# Patient Record
Sex: MALE | Race: WHITE | NOT HISPANIC OR LATINO
[De-identification: names, ages, dates, MRNs, and addresses within clinical notes are randomized per-mention and may not be internally consistent; named-entity substitution may affect disease eponyms.]

---

## 2021-11-18 PROBLEM — Z00.00 ENCOUNTER FOR PREVENTIVE HEALTH EXAMINATION: Status: ACTIVE | Noted: 2021-11-18

## 2021-11-19 ENCOUNTER — NON-APPOINTMENT (OUTPATIENT)
Age: 76
End: 2021-11-19

## 2021-11-19 ENCOUNTER — APPOINTMENT (OUTPATIENT)
Dept: UROLOGY | Facility: CLINIC | Age: 76
End: 2021-11-19
Payer: MEDICARE

## 2021-11-19 VITALS
TEMPERATURE: 97.2 F | DIASTOLIC BLOOD PRESSURE: 84 MMHG | OXYGEN SATURATION: 99 % | HEART RATE: 75 BPM | SYSTOLIC BLOOD PRESSURE: 172 MMHG

## 2021-11-19 PROCEDURE — 99204 OFFICE O/P NEW MOD 45 MIN: CPT

## 2021-11-19 NOTE — LETTER BODY
[Dear  ___] : Dear  [unfilled], [Consult Letter:] : I had the pleasure of evaluating your patient, [unfilled]. [Please see my note below.] : Please see my note below. [Consult Closing:] : Thank you very much for allowing me to participate in the care of this patient.  If you have any questions, please do not hesitate to contact me. [FreeTextEntry3] : Best Regards, \par \par Khadra Roper MD\par

## 2021-11-19 NOTE — ASSESSMENT
[FreeTextEntry1] : We discussed the replacement of the present Coloplast Laila prosthesis with an Ambicor inflatable penile prosthesis in detail with the indications risks alternatives and chances for success I reviewed with him the small chance of postoperative infection that would require removal of the Ambicor prosthesis and replacement with another semirigid type prosthesis.  We also discussed preoperative evaluation and clearance with Dr. Harding and his cardiologist and our preoperative protocol to minimize the chance of postoperative infection.  Patient is amenable with this approach consents to surgery date was made.\par \par Urine sent for culture today. Khadra Roper MD\par

## 2021-11-19 NOTE — PHYSICAL EXAM
[General Appearance - Well Developed] : well developed [General Appearance - Well Nourished] : well nourished [Normal Appearance] : normal appearance [Well Groomed] : well groomed [General Appearance - In No Acute Distress] : no acute distress [Edema] : no peripheral edema [Respiration, Rhythm And Depth] : normal respiratory rhythm and effort [] : no respiratory distress [Exaggerated Use Of Accessory Muscles For Inspiration] : no accessory muscle use [Abdomen Soft] : soft [Abdomen Tenderness] : non-tender [Costovertebral Angle Tenderness] : no ~M costovertebral angle tenderness [Urethral Meatus] : meatus normal [Testes Tenderness] : no tenderness of the testes [Testes Mass (___cm)] : there were no testicular masses [Normal Station and Gait] : the gait and station were normal for the patient's age [No Focal Deficits] : no focal deficits [Oriented To Time, Place, And Person] : oriented to person, place, and time [Affect] : the affect was normal [Mood] : the mood was normal [Not Anxious] : not anxious [FreeTextEntry1] : Malleable penile prosthesis with rotational instability, no signs of infection,

## 2021-11-19 NOTE — HISTORY OF PRESENT ILLNESS
[FreeTextEntry1] : 73 YO M seen TODAY 11/19/2021 as NPT for consultation after a failed implant. PAtient had [facility] ago. Due to post op ED, he underwent implantation of 3 piece inflatable IPP 11 yr ago. This functioned until 1 yr ago. Patient underwent removal and replacement surgery by Jeovanny Stark on 8/13/2021. At that time, Dr. 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 He was unable to remove the right reservoir. PAtient has healed well, but is very dissatisfied with the malleable prosthesis that was implanted. It is unstable and anita. He is here to discuss options.

## 2021-11-23 LAB
BACTERIA UR CULT: NORMAL
BILIRUB UR QL STRIP: NORMAL
CLARITY UR: CLEAR
COLLECTION METHOD: NORMAL
GLUCOSE UR-MCNC: NORMAL
HCG UR QL: 0.2 EU/DL
HGB UR QL STRIP.AUTO: NORMAL
KETONES UR-MCNC: NORMAL
LEUKOCYTE ESTERASE UR QL STRIP: NORMAL
NITRITE UR QL STRIP: NORMAL
PH UR STRIP: 6.5
PROT UR STRIP-MCNC: NORMAL
SP GR UR STRIP: 1.02

## 2021-12-15 ENCOUNTER — APPOINTMENT (OUTPATIENT)
Dept: UROLOGY | Facility: AMBULATORY SURGERY CENTER | Age: 76
End: 2021-12-15

## 2022-01-04 ENCOUNTER — TRANSCRIPTION ENCOUNTER (OUTPATIENT)
Age: 77
End: 2022-01-04

## 2022-01-04 ENCOUNTER — NON-APPOINTMENT (OUTPATIENT)
Age: 77
End: 2022-01-04

## 2022-01-04 VITALS
OXYGEN SATURATION: 99 % | SYSTOLIC BLOOD PRESSURE: 136 MMHG | HEIGHT: 69 IN | HEART RATE: 71 BPM | WEIGHT: 165.79 LBS | RESPIRATION RATE: 16 BRPM | TEMPERATURE: 98 F | DIASTOLIC BLOOD PRESSURE: 81 MMHG

## 2022-01-04 NOTE — ASU PATIENT PROFILE, ADULT - NSICDXPASTSURGICALHX_GEN_ALL_CORE_FT
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

## 2022-01-04 NOTE — ASU PATIENT PROFILE, ADULT - VISION (WITH CORRECTIVE LENSES IF THE PATIENT USUALLY WEARS THEM):
wear reading glasses/Normal vision: sees adequately in most situations; can see medication labels, newsprint

## 2022-01-04 NOTE — PRE-OP CHECKLIST - 1.
Spoke with Anesthesia MD Cole who directed to call surgeon. Spoke with MD Roper regarding ASA stopped on 12/31/21, MD Roper states he will call cardiologist and will call back about patient's instructions for ASA prior to surgery tomorrow.

## 2022-01-04 NOTE — ASU PATIENT PROFILE, ADULT - NSALCOHOLTYPE_GEN__A_CORE_SD
Hearing screen attempted multiple times, unsuccessful.  Screening team will return tomorrow per  Ti at [phone] .   wine

## 2022-01-04 NOTE — ASU PATIENT PROFILE, ADULT - FALL HARM RISK - UNIVERSAL INTERVENTIONS
Bed in lowest position, wheels locked, appropriate side rails in place/Call bell, personal items and telephone in reach/Instruct patient to call for assistance before getting out of bed or chair/Non-slip footwear when patient is out of bed/[place] to call system/Physically safe environment - no spills, clutter or unnecessary equipment/Purposeful Proactive Rounding/Room/bathroom lighting operational, light cord in reach

## 2022-01-04 NOTE — PRE-OP CHECKLIST - SELECT TESTS ORDERED
Urine Cx; Cardiac clearance/CBC/CMP/PT/PTT/INR/Urinalysis/EKG Urine Cx; Cardiac clearance/CBC/CMP/PT/PTT/INR/Urinalysis/EKG/COVID-19

## 2022-01-04 NOTE — ASU PATIENT PROFILE, ADULT - NSICDXPASTMEDICALHX_GEN_ALL_CORE_FT
PAST MEDICAL HISTORY:  CAD (coronary artery disease)     Glaucoma     HLD (hyperlipidemia)     HTN (hypertension)     Severe aortic stenosis     Syncope 11/28/2021     PAST MEDICAL HISTORY:  CAD (coronary artery disease)     Chronic ITP (idiopathic thrombocytopenia)     Glaucoma     HLD (hyperlipidemia)     HTN (hypertension)     Prostate cancer     Severe aortic stenosis     Syncope 11/28/2021

## 2022-01-05 ENCOUNTER — TRANSCRIPTION ENCOUNTER (OUTPATIENT)
Age: 77
End: 2022-01-05

## 2022-01-05 ENCOUNTER — APPOINTMENT (OUTPATIENT)
Dept: UROLOGY | Facility: HOSPITAL | Age: 77
End: 2022-01-05

## 2022-01-05 ENCOUNTER — OUTPATIENT (OUTPATIENT)
Dept: OUTPATIENT SERVICES | Facility: HOSPITAL | Age: 77
LOS: 1 days | Discharge: HOME | End: 2022-01-05
Payer: MEDICARE

## 2022-01-05 VITALS
OXYGEN SATURATION: 98 % | DIASTOLIC BLOOD PRESSURE: 78 MMHG | HEART RATE: 82 BPM | RESPIRATION RATE: 27 BRPM | SYSTOLIC BLOOD PRESSURE: 155 MMHG

## 2022-01-05 DIAGNOSIS — Z96.0 PRESENCE OF UROGENITAL IMPLANTS: Chronic | ICD-10-CM

## 2022-01-05 DIAGNOSIS — Z98.890 OTHER SPECIFIED POSTPROCEDURAL STATES: Chronic | ICD-10-CM

## 2022-01-05 DIAGNOSIS — Z90.79 ACQUIRED ABSENCE OF OTHER GENITAL ORGAN(S): Chronic | ICD-10-CM

## 2022-01-05 DIAGNOSIS — Z95.0 PRESENCE OF CARDIAC PACEMAKER: Chronic | ICD-10-CM

## 2022-01-05 DIAGNOSIS — Z95.2 PRESENCE OF PROSTHETIC HEART VALVE: Chronic | ICD-10-CM

## 2022-01-05 PROCEDURE — C1813: CPT

## 2022-01-05 PROCEDURE — 54410 REMOVE/REPLACE PENIS PROSTH: CPT

## 2022-01-05 PROCEDURE — 54401 INSERT SELF-CONTD PROSTHESIS: CPT

## 2022-01-05 DEVICE — SURGIFLO HEMOSTATIC MATRIX KIT: Type: IMPLANTABLE DEVICE | Status: FUNCTIONAL

## 2022-01-05 DEVICE — IMPLANTABLE DEVICE: Type: IMPLANTABLE DEVICE | Status: FUNCTIONAL

## 2022-01-05 RX ORDER — ROSUVASTATIN CALCIUM 5 MG/1
1 TABLET ORAL
Qty: 0 | Refills: 0 | DISCHARGE

## 2022-01-05 RX ORDER — ASPIRIN/CALCIUM CARB/MAGNESIUM 324 MG
1 TABLET ORAL
Qty: 0 | Refills: 0 | DISCHARGE

## 2022-01-05 RX ORDER — ACETAMINOPHEN 500 MG
650 TABLET ORAL EVERY 6 HOURS
Refills: 0 | Status: DISCONTINUED | OUTPATIENT
Start: 2022-01-05 | End: 2022-01-05

## 2022-01-05 RX ORDER — MORPHINE SULFATE 50 MG/1
2 CAPSULE, EXTENDED RELEASE ORAL ONCE
Refills: 0 | Status: DISCONTINUED | OUTPATIENT
Start: 2022-01-05 | End: 2022-01-05

## 2022-01-05 RX ORDER — VANCOMYCIN HCL 1 G
1000 VIAL (EA) INTRAVENOUS ONCE
Refills: 0 | Status: COMPLETED | OUTPATIENT
Start: 2022-01-05 | End: 2022-01-05

## 2022-01-05 RX ORDER — GENTAMICIN SULFATE 40 MG/ML
240 VIAL (ML) INJECTION ONCE
Refills: 0 | Status: DISCONTINUED | OUTPATIENT
Start: 2022-01-05 | End: 2022-01-05

## 2022-01-05 RX ORDER — FLUOROMETHOLONE 1 MG/ML
1 SOLUTION/ DROPS OPHTHALMIC
Qty: 0 | Refills: 0 | DISCHARGE

## 2022-01-05 RX ORDER — BRIMONIDINE TARTRATE, TIMOLOL MALEATE 2; 5 MG/ML; MG/ML
1 SOLUTION/ DROPS OPHTHALMIC
Qty: 0 | Refills: 0 | DISCHARGE

## 2022-01-05 RX ORDER — HYDROMORPHONE HYDROCHLORIDE 2 MG/ML
0.5 INJECTION INTRAMUSCULAR; INTRAVENOUS; SUBCUTANEOUS EVERY 4 HOURS
Refills: 0 | Status: DISCONTINUED | OUTPATIENT
Start: 2022-01-05 | End: 2022-01-05

## 2022-01-05 RX ORDER — EZETIMIBE 10 MG/1
1 TABLET ORAL
Qty: 0 | Refills: 0 | DISCHARGE

## 2022-01-05 RX ORDER — CARVEDILOL PHOSPHATE 80 MG/1
1 CAPSULE, EXTENDED RELEASE ORAL
Qty: 0 | Refills: 0 | DISCHARGE

## 2022-01-05 RX ORDER — DOCUSATE SODIUM 100 MG
2 CAPSULE ORAL
Qty: 40 | Refills: 0
Start: 2022-01-05 | End: 2022-01-14

## 2022-01-05 RX ADMIN — MORPHINE SULFATE 2 MILLIGRAM(S): 50 CAPSULE, EXTENDED RELEASE ORAL at 17:37

## 2022-01-05 RX ADMIN — Medication 250 MILLIGRAM(S): at 12:04

## 2022-01-05 RX ADMIN — MORPHINE SULFATE 2 MILLIGRAM(S): 50 CAPSULE, EXTENDED RELEASE ORAL at 18:00

## 2022-01-05 NOTE — DISCHARGE NOTE NURSING/CASE MANAGEMENT/SOCIAL WORK - PATIENT PORTAL LINK FT
You can access the FollowMyHealth Patient Portal offered by [facility] by registering at the following website: [email or web address]. By joining [employer]’s FollowMyHealth portal, you will also be able to view your health information using other applications (apps) compatible with our system.

## 2022-01-05 NOTE — ASU DISCHARGE PLAN (ADULT/PEDIATRIC) - NS MD DC FALL RISK RISK
For information on Fall & Injury Prevention, visit: [email or web address] OR  [email or web address] OR  https://www.cdc.gov/steadi/patient.html

## 2022-01-05 NOTE — PACU DISCHARGE NOTE - COMMENTS
Patient met PACU criteria, surgical dressing intact, bills catheter to leg bag drainage with adequate clear yellow urine, discharge instructions provided to patient, patient escorted to lobby via wheelchair accompanied by PCA and discharged home accompanied by friend

## 2022-01-05 NOTE — ASU DISCHARGE PLAN (ADULT/PEDIATRIC) - CARE PROVIDER_API CALL
Khadra Roper)  Urology  [address], [address] [place] [address]  Phone: [phone]  Fax: [phone]  Scheduled Appointment: 01/07/2022

## 2022-01-05 NOTE — DISCHARGE NOTE NURSING/CASE MANAGEMENT/SOCIAL WORK - NSDCPEFALRISK_GEN_ALL_CORE
For information on Fall & Injury Prevention, visit: [email or web address] OR  [email or web address] OR  https://www.cdc.gov/steadi/patient.html

## 2022-01-06 ENCOUNTER — NON-APPOINTMENT (OUTPATIENT)
Age: 77
End: 2022-01-06

## 2022-01-06 PROBLEM — R55 SYNCOPE AND COLLAPSE: Chronic | Status: ACTIVE | Noted: 2022-01-04

## 2022-01-06 PROBLEM — E78.5 HYPERLIPIDEMIA, UNSPECIFIED: Chronic | Status: ACTIVE | Noted: 2022-01-04

## 2022-01-06 PROBLEM — D69.3 IMMUNE THROMBOCYTOPENIC PURPURA: Chronic | Status: ACTIVE | Noted: 2022-01-04

## 2022-01-06 PROBLEM — H40.9 UNSPECIFIED GLAUCOMA: Chronic | Status: ACTIVE | Noted: 2022-01-04

## 2022-01-06 PROBLEM — I25.10 ATHEROSCLEROTIC HEART DISEASE OF NATIVE CORONARY ARTERY WITHOUT ANGINA PECTORIS: Chronic | Status: ACTIVE | Noted: 2022-01-04

## 2022-01-06 PROBLEM — I35.0 NONRHEUMATIC AORTIC (VALVE) STENOSIS: Chronic | Status: ACTIVE | Noted: 2022-01-04

## 2022-01-06 PROBLEM — C61 MALIGNANT NEOPLASM OF PROSTATE: Chronic | Status: ACTIVE | Noted: 2022-01-04

## 2022-01-06 PROBLEM — I10 ESSENTIAL (PRIMARY) HYPERTENSION: Chronic | Status: ACTIVE | Noted: 2022-01-04

## 2022-01-06 RX ORDER — CEPHALEXIN 500 MG
3 CAPSULE ORAL
Qty: 60 | Refills: 0
Start: 2022-01-06 | End: 2022-01-15

## 2022-01-07 ENCOUNTER — APPOINTMENT (OUTPATIENT)
Dept: UROLOGY | Facility: CLINIC | Age: 77
End: 2022-01-07
Payer: MEDICARE

## 2022-01-07 VITALS
HEIGHT: 69 IN | DIASTOLIC BLOOD PRESSURE: 98 MMHG | SYSTOLIC BLOOD PRESSURE: 165 MMHG | HEART RATE: 50 BPM | BODY MASS INDEX: 23.7 KG/M2 | TEMPERATURE: 97.2 F | OXYGEN SATURATION: 99 % | WEIGHT: 160 LBS

## 2022-01-07 PROCEDURE — 99024 POSTOP FOLLOW-UP VISIT: CPT

## 2022-01-07 NOTE — ASSESSMENT
[FreeTextEntry1] : Jackson catheter was removed today and patient underwent successful voiding trial.  We discussed steps to help absorb his scrotal hematoma and make plans for him to follow-up in 1 week. Khadra Roper MD\par

## 2022-01-07 NOTE — LETTER BODY
[Dear  ___] : Dear  [unfilled], [Courtesy Letter:] : I had the pleasure of seeing your patient, [unfilled], in my office today. [Please see my note below.] : Please see my note below. [Consult Closing:] : Thank you very much for allowing me to participate in the care of this patient.  If you have any questions, please do not hesitate to contact me. [FreeTextEntry3] : Best Regards, \par \par Khadra Roper MD\par

## 2022-01-07 NOTE — PHYSICAL EXAM
[FreeTextEntry1] : Jackson in good position. Significant swelling and subcutaneous hematoma on the scrotum and right groin, incision clean and dry. no sign of infection.

## 2022-01-07 NOTE — HISTORY OF PRESENT ILLNESS
[FreeTextEntry1] : 75 YO M seen 11/19/2021 as NPT for consultation after a failed implant. PAtient [place] [facility] [place] ago. Due to post op ED, he underwent implantation of 3 piece inflatable IPP 11 yr ago. This functioned until 1 yr ago. Patient underwent removal and replacement surgery by Jeovanny Stark on 8/13/2021. At that time, Dr. 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 He was unable to remove the right reservoir. PAtient has healed well, but is very dissatisfied with the malleable prosthesis that was implanted. It is unstable and anita. He is here to discuss options.\par \par Patient underwent implantation of AMS Ambicor IPP at [facility] on 1/5/2022 with no complications.\par \par Patient seen TODAY 1/7/2022 for VT. He is comfortable since surgery, He started the Levaquin late but is now on the Keflex as prescribed.

## 2022-01-14 ENCOUNTER — APPOINTMENT (OUTPATIENT)
Dept: UROLOGY | Facility: CLINIC | Age: 77
End: 2022-01-14
Payer: MEDICARE

## 2022-01-14 LAB
BILIRUB UR QL STRIP: NORMAL
CLARITY UR: CLEAR
COLLECTION METHOD: NORMAL
GLUCOSE UR-MCNC: NORMAL
HCG UR QL: 0.2 EU/DL
HGB UR QL STRIP.AUTO: NORMAL
KETONES UR-MCNC: NORMAL
LEUKOCYTE ESTERASE UR QL STRIP: NORMAL
NITRITE UR QL STRIP: NORMAL
PH UR STRIP: 5.5
PROT UR STRIP-MCNC: NORMAL
SP GR UR STRIP: 1

## 2022-01-14 PROCEDURE — 99024 POSTOP FOLLOW-UP VISIT: CPT

## 2022-01-14 PROCEDURE — 81003 URINALYSIS AUTO W/O SCOPE: CPT | Mod: QW

## 2022-01-14 NOTE — ASSESSMENT
[FreeTextEntry1] : Patient continues to heal postoperatively with slow resolution of his scrotal and suprapubic hematoma.  No sign or indication of infection at this time however we will continue to follow this closely and I will continue him on antibiotics for at least 1 more week.  These were ordered.  I deflated the prosthesis today by bending the shaft and he tolerated this well with no apparent pain.  He does have some pain on palpation of the scrotum.  Follow-up in 1 week. Khadra Roper MD\par \par Pharmacy info NA, message left for patient to update this info. Khadra Roper MD\par

## 2022-01-14 NOTE — PHYSICAL EXAM
[FreeTextEntry1] : He continues to resolve the hematoma but there is still swelling and discoloration present. No signs of fluctuance or drainage, no indication of infection.

## 2022-01-14 NOTE — HISTORY OF PRESENT ILLNESS
[FreeTextEntry1] : 73 YO M seen 11/19/2021 as NPT for consultation after a failed implant. [place] [facility]. Due to post op ED, he underwent implantation of 3 piece inflatable IPP 11 yr ago. This functioned until 1 yr ago. Patient underwent removal and replacement surgery by Jeovanny Stark on 8/13/2021. At that time, Dr. 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 He was unable to remove the right reservoir. PAtient has healed well, but is very dissatisfied with the malleable prosthesis that was implanted. It is unstable and anita. He is here to discuss options.\par \par Patient underwent implantation of AMS Ambicor IPP at [facility] on 1/5/2022 with no complications.\par \par Patient seen 1/7/2022 for VT. He is comfortable since surgery, He started the Levaquin late but is now on the Keflex as prescribed. \par \par Patient seen TODAY 1/14/2022 for post op wound check. He notes persistent post op pain in the shaft but does not require pain medication.

## 2022-01-21 ENCOUNTER — APPOINTMENT (OUTPATIENT)
Dept: UROLOGY | Facility: CLINIC | Age: 77
End: 2022-01-21
Payer: MEDICARE

## 2022-01-21 VITALS
HEART RATE: 65 BPM | HEIGHT: 65 IN | BODY MASS INDEX: 26.66 KG/M2 | WEIGHT: 160 LBS | DIASTOLIC BLOOD PRESSURE: 83 MMHG | SYSTOLIC BLOOD PRESSURE: 130 MMHG | TEMPERATURE: 97.3 F

## 2022-01-21 LAB
BILIRUB UR QL STRIP: NORMAL
CLARITY UR: CLEAR
COLLECTION METHOD: NORMAL
GLUCOSE UR-MCNC: NORMAL
HCG UR QL: 0.2 EU/DL
HGB UR QL STRIP.AUTO: NORMAL
KETONES UR-MCNC: NORMAL
LEUKOCYTE ESTERASE UR QL STRIP: NORMAL
NITRITE UR QL STRIP: NORMAL
PH UR STRIP: 6.5
PROT UR STRIP-MCNC: NORMAL
SP GR UR STRIP: 1

## 2022-01-21 PROCEDURE — 81003 URINALYSIS AUTO W/O SCOPE: CPT | Mod: QW

## 2022-01-21 PROCEDURE — 99024 POSTOP FOLLOW-UP VISIT: CPT

## 2022-01-21 RX ORDER — CEPHALEXIN 250 MG/1
250 CAPSULE ORAL TWICE DAILY
Qty: 84 | Refills: 0 | Status: ACTIVE | COMMUNITY
Start: 2022-01-14 | End: 1900-01-01

## 2022-01-21 NOTE — PHYSICAL EXAM
[FreeTextEntry1] : Scrotal swelling continues to diminish, ecchymosis improving, scrotal hematoma improving, resipump palpable and in good position, no signs of infection or fluctuance.

## 2022-01-21 NOTE — ASSESSMENT
[FreeTextEntry1] : Hematoma resolving. We will reassess in 2 weeks, to continue on Keflex and warm compresses until then. Khadra Roper MD\par

## 2022-01-21 NOTE — HISTORY OF PRESENT ILLNESS
[FreeTextEntry1] : 75 YO M seen 11/19/2021 as NPT for consultation after a failed implant. [place]Atient [place] [facility] yr ago. Due to post op ED, he underwent implantation of 3 piece inflatable IPP 11 yr ago. This functioned until 1 yr ago. Patient underwent removal and replacement surgery by Jeovanny Stark on 8/13/2021. At that time, Dr. 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 He was unable to remove the right reservoir. PAtient has healed well, but is very dissatisfied with the malleable prosthesis that was implanted. It is unstable and anita. He is here to discuss options.\par \par Patient underwent implantation of AMS Ambicor IPP at [facility] on 1/5/2022 with no complications.\par \par Patient seen 1/7/2022 for VT. He is comfortable since surgery, He started the Levaquin late but is now on the Keflex as prescribed. \par \par Patient seen 1/14/2022 for post op wound check. He notes persistent post op pain in the shaft but does not require pain medication.\par \par Patient seen TODAY 1/21/2022 for interval check. He continues to improve with less pain.

## 2022-01-24 ENCOUNTER — NON-APPOINTMENT (OUTPATIENT)
Age: 77
End: 2022-01-24

## 2022-01-25 ENCOUNTER — NON-APPOINTMENT (OUTPATIENT)
Age: 77
End: 2022-01-25

## 2022-02-04 ENCOUNTER — APPOINTMENT (OUTPATIENT)
Dept: UROLOGY | Facility: CLINIC | Age: 77
End: 2022-02-04
Payer: MEDICARE

## 2022-02-04 VITALS — HEART RATE: 73 BPM | DIASTOLIC BLOOD PRESSURE: 75 MMHG | TEMPERATURE: 97.2 F | SYSTOLIC BLOOD PRESSURE: 120 MMHG

## 2022-02-04 LAB
BILIRUB UR QL STRIP: NORMAL
CLARITY UR: CLEAR
COLLECTION METHOD: NORMAL
GLUCOSE UR-MCNC: NORMAL
HCG UR QL: 0.2 EU/DL
HGB UR QL STRIP.AUTO: NORMAL
KETONES UR-MCNC: NORMAL
LEUKOCYTE ESTERASE UR QL STRIP: NORMAL
NITRITE UR QL STRIP: NORMAL
PH UR STRIP: 5.5
PROT UR STRIP-MCNC: NORMAL
SP GR UR STRIP: 1.03

## 2022-02-04 PROCEDURE — 81003 URINALYSIS AUTO W/O SCOPE: CPT | Mod: QW

## 2022-02-04 PROCEDURE — 99024 POSTOP FOLLOW-UP VISIT: CPT

## 2022-02-04 NOTE — HISTORY OF PRESENT ILLNESS
[FreeTextEntry1] : 75 YO M seen 11/19/2021 as NPT for consultation after a failed implant. PAtient had a radical [place] [facility] 13 yr ago. Due to post op ED, he underwent implantation of 3 piece inflatable IPP 11 yr ago. This functioned until 1 yr ago. Patient underwent removal and replacement surgery by Jeovanny Stark on 8/13/2021. At that time, Dr. 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 He was unable to remove the right reservoir. PAtient has healed well, but is very dissatisfied with the malleable prosthesis that was implanted. It is unstable and anita. He is here to discuss options.\par \par Patient underwent implantation of AMS Ambicor IPP at [facility] on 1/5/2022 with no complications.\par \par Patient seen 1/7/2022 for VT. He is comfortable since surgery, He started the Levaquin late but is now on the Keflex as prescribed. \par \par Patient seen 1/14/2022 for post op wound check. He notes persistent post op pain in the shaft but does not require pain medication.\par \par Patient seen  1/21/2022 for interval check. He continues to improve with less pain.\par \par Patient seen TODAY 2/4/2022 for interval check. He continues to heal well with no signs of infection. He has noted small raised erythematous spots scattered on his abdomen and shoulders over the last 2 - 3 days, somewhat itchy at times. \par UA negative.

## 2022-02-04 NOTE — PHYSICAL EXAM
[General Appearance - Well Developed] : well developed [General Appearance - Well Nourished] : well nourished [Normal Appearance] : normal appearance [Well Groomed] : well groomed [General Appearance - In No Acute Distress] : no acute distress [Abdomen Soft] : soft [Abdomen Tenderness] : non-tender [Costovertebral Angle Tenderness] : no ~M costovertebral angle tenderness [Urethral Meatus] : meatus normal [Urinary Bladder Findings] : the bladder was normal on palpation [Scrotum] : the scrotum was normal [Testes Mass (___cm)] : there were no testicular masses [No Prostate Nodules] : no prostate nodules [FreeTextEntry1] : Few scattered raised pink cutaneous lesions, not consistent with an allergic reaction, [Edema] : no peripheral edema [] : no respiratory distress [Respiration, Rhythm And Depth] : normal respiratory rhythm and effort [Exaggerated Use Of Accessory Muscles For Inspiration] : no accessory muscle use [Oriented To Time, Place, And Person] : oriented to person, place, and time [Affect] : the affect was normal [Mood] : the mood was normal [Not Anxious] : not anxious [Normal Station and Gait] : the gait and station were normal for the patient's age

## 2022-02-04 NOTE — ASSESSMENT
[FreeTextEntry1] : Findings today are consistent with a good postoperative healing and resolution of his postoperative hematoma.  I recommended that he continue practicing inflation and deflation follow-up with me in 4 weeks if all goes well or sooner if there is a problem.  As for the rash, he no longer requires Keflex and this was discontinued today.  I recommended that if this risk persists more than another day or 2 he should consult his dermatologist.  I am not convinced that this is an allergic reaction to the Keflex based on the findings today. Khadra Roper MD\par

## 2022-02-18 ENCOUNTER — APPOINTMENT (OUTPATIENT)
Dept: UROLOGY | Facility: CLINIC | Age: 77
End: 2022-02-18
Payer: MEDICARE

## 2022-02-18 VITALS
SYSTOLIC BLOOD PRESSURE: 145 MMHG | HEART RATE: 60 BPM | WEIGHT: 163 LBS | TEMPERATURE: 97.8 F | BODY MASS INDEX: 24.71 KG/M2 | RESPIRATION RATE: 14 BRPM | HEIGHT: 68 IN | DIASTOLIC BLOOD PRESSURE: 75 MMHG

## 2022-02-18 PROCEDURE — 99024 POSTOP FOLLOW-UP VISIT: CPT

## 2022-02-18 NOTE — HISTORY OF PRESENT ILLNESS
[FreeTextEntry1] : 73 YO M seen 11/19/2021 as NPT for consultation after a failed implant. PAtient had a radical prostatectomy at [facility] 13 yr ago. Due to post op ED, he underwent implantation of 3 piece inflatable IPP 11 yr ago. This functioned until 1 yr ago. Patient underwent removal and replacement surgery by Jeovanny Stark on 8/13/2021. At that time, Dr. 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 He was unable to remove the right reservoir. PAtient has healed well, but is very dissatisfied with the malleable prosthesis that was implanted. It is unstable and anita. He is here to discuss options.\par \par Patient underwent implantation of AMS Ambicor IPP at [facility] on 1/5/2022 with no complications.\par \par Patient seen 1/7/2022 for VT. He is comfortable since surgery, He started the Levaquin late but is now on the Keflex as prescribed. \par \par Patient seen 1/14/2022 for post op wound check. He notes persistent post op pain in the shaft but does not require pain medication.\par \par Patient seen  1/21/2022 for interval check. He continues to improve with less pain.\par \par Patient seen  2/4/2022 for interval check. He continues to heal well with no signs of infection. He has noted small raised erythematous spots scattered on his abdomen and shoulders over the last 2 - 3 days, somewhat itchy at times. \par UA negative.\par \par Patient seen TODAY 2/18/2022 for more instruction with inflation and deflation. Swelling continues to improve, no sign of infection or pain.

## 2022-02-18 NOTE — ASSESSMENT
[FreeTextEntry1] : I again instructed the patient in inflation and deflation of his Ambicor penile prosthesis including having him inflate and deflate the prosthesis.  He still needs more practice but it is obvious that the penis the prosthesis is working well and he was able to do both tasks today.  Follow-up as planned next month. Khadra Roper MD\par

## 2022-02-24 ENCOUNTER — APPOINTMENT (OUTPATIENT)
Dept: UROLOGY | Facility: CLINIC | Age: 77
End: 2022-02-24

## 2022-03-04 ENCOUNTER — APPOINTMENT (OUTPATIENT)
Dept: UROLOGY | Facility: CLINIC | Age: 77
End: 2022-03-04
Payer: MEDICARE

## 2022-03-04 VITALS
DIASTOLIC BLOOD PRESSURE: 80 MMHG | BODY MASS INDEX: 25.01 KG/M2 | HEART RATE: 70 BPM | WEIGHT: 165 LBS | HEIGHT: 68 IN | SYSTOLIC BLOOD PRESSURE: 141 MMHG | TEMPERATURE: 98.2 F

## 2022-03-04 LAB
BILIRUB UR QL STRIP: NEGATIVE
CLARITY UR: CLEAR
COLLECTION METHOD: NORMAL
GLUCOSE UR-MCNC: NEGATIVE
HCG UR QL: 0.2 EU/DL
HGB UR QL STRIP.AUTO: NEGATIVE
KETONES UR-MCNC: NEGATIVE
LEUKOCYTE ESTERASE UR QL STRIP: NEGATIVE
NITRITE UR QL STRIP: NEGATIVE
PH UR STRIP: 5.5
PROT UR STRIP-MCNC: NEGATIVE
SP GR UR STRIP: 1

## 2022-03-04 PROCEDURE — 99024 POSTOP FOLLOW-UP VISIT: CPT

## 2022-03-04 PROCEDURE — 81003 URINALYSIS AUTO W/O SCOPE: CPT | Mod: QW

## 2022-03-04 NOTE — PHYSICAL EXAM
[Testes Tenderness] : no tenderness of the testes [Testes Mass (___cm)] : there were no testicular masses [FreeTextEntry1] : Prosthesis in good position, skin well healed, minimal non-tender swelling anterior to the pump. Mild tenderness in the area of the left caput, no swelling or abscess palpable.

## 2022-03-04 NOTE — ASSESSMENT
[FreeTextEntry1] : I reviewed inflation and deflation with the patient. He had been pumping right over the area of mild swelling anterior to the pump and I recommended that he try to avoid that spot. He was instructed to continue to use the prosthesis as desired. FU 3 months. Khadra Roper MD\par

## 2022-03-04 NOTE — HISTORY OF PRESENT ILLNESS
[FreeTextEntry1] : 75 YO M seen 11/19/2021 as NPT for consultation after a failed implant. PAtient had a radical prostatectomy at [facility] [place] 13 yr ago. Due to post op ED, he underwent implantation of 3 piece inflatable IPP 11 yr ago. This functioned until 1 yr ago. Patient underwent removal and replacement surgery by Jeovanny Stark on 8/13/2021. At that time, Dr. 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 He was unable to remove the right reservoir. PAtient has healed well, but is very dissatisfied with the malleable prosthesis that was implanted. It is unstable and anita. He is here to discuss options.\par \par Patient underwent implantation of AMS Ambicor IPP at [facility] on 1/5/2022 with no complications.\par \par Patient seen 1/7/2022 for VT. He is comfortable since surgery, He started the Levaquin late but is now on the Keflex as prescribed. \par \par Patient seen 1/14/2022 for post op wound check. He notes persistent post op pain in the shaft but does not require pain medication.\par \par Patient seen  1/21/2022 for interval check. He continues to improve with less pain.\par \par Patient seen  2/4/2022 for interval check. He continues to heal well with no signs of infection. He has noted small raised erythematous spots scattered on his abdomen and shoulders over the last 2 - 3 days, somewhat itchy at times. \par UA negative.\par \par Patient seen  2/18/2022 for more instruction with inflation and deflation. Swelling continues to improve, no sign of infection or pain. \par \par Patient seen TODAY 3/4/2022 for interval check. He told me that he has been able to successfully inflate and deflate the prosthesis and has had satisfactory intercourse since our last visit. He has noted some mild tenderness with siting in the area of the left testis.\par UA  negative

## 2022-06-02 ENCOUNTER — APPOINTMENT (OUTPATIENT)
Dept: UROLOGY | Facility: CLINIC | Age: 77
End: 2022-06-02
Payer: MEDICARE

## 2022-06-02 VITALS
TEMPERATURE: 98.3 F | WEIGHT: 165 LBS | HEIGHT: 68 IN | DIASTOLIC BLOOD PRESSURE: 75 MMHG | SYSTOLIC BLOOD PRESSURE: 147 MMHG | HEART RATE: 67 BPM | BODY MASS INDEX: 25.01 KG/M2

## 2022-06-02 LAB
BILIRUB UR QL STRIP: NORMAL
CLARITY UR: CLEAR
COLLECTION METHOD: NORMAL
GLUCOSE UR-MCNC: NORMAL
HCG UR QL: 0.2 EU/DL
HGB UR QL STRIP.AUTO: NORMAL
KETONES UR-MCNC: NORMAL
LEUKOCYTE ESTERASE UR QL STRIP: NORMAL
NITRITE UR QL STRIP: NORMAL
PH UR STRIP: 6.5
PROT UR STRIP-MCNC: NORMAL
SP GR UR STRIP: 1.01

## 2022-06-02 PROCEDURE — 81003 URINALYSIS AUTO W/O SCOPE: CPT | Mod: QW

## 2022-06-02 PROCEDURE — 99214 OFFICE O/P EST MOD 30 MIN: CPT

## 2022-06-02 NOTE — ASSESSMENT
[FreeTextEntry1] : I reviewed with the patient proper use of the pump floor inflation and deflation and allowed him to identify the pump after my direction.  I also supplied him with further written information and diagrams to help him when he is not in the office.  He appeared satisfied with this information and feels that he will be able to function normally.\par \par We discussed follow-up of his prostate cancer.  He informs me that if his PSA is not evaluated by PCP Mehdi on his next visit he will notify me and we will test when I see him next.  We made plans to follow-up in 1 year.\par \par As for the mild S URI, I reviewed with him options including periurethral injections which have questionable indication and someone within planted inflatable penile prosthesis.  I also reviewed with him implantation of an artificial sphincter and the difficulties attached to another implant in the same general area.  For now he is content to remain with his symptoms. Khadra Roper MD\par

## 2022-06-02 NOTE — PHYSICAL EXAM
[General Appearance - Well Developed] : well developed [General Appearance - Well Nourished] : well nourished [Normal Appearance] : normal appearance [Well Groomed] : well groomed [General Appearance - In No Acute Distress] : no acute distress [Abdomen Soft] : soft [Abdomen Tenderness] : non-tender [Costovertebral Angle Tenderness] : no ~M costovertebral angle tenderness [Urethral Meatus] : meatus normal [Penis Abnormality] : normal circumcised penis [Testes Tenderness] : no tenderness of the testes [Testes Mass (___cm)] : there were no testicular masses [FreeTextEntry1] : Pump easily palpable in the mid scrotum with minimal surrounding fibrotic tissue.  [Edema] : no peripheral edema [] : no respiratory distress [Respiration, Rhythm And Depth] : normal respiratory rhythm and effort [Exaggerated Use Of Accessory Muscles For Inspiration] : no accessory muscle use [Oriented To Time, Place, And Person] : oriented to person, place, and time [Affect] : the affect was normal [Mood] : the mood was normal [Not Anxious] : not anxious [Normal Station and Gait] : the gait and station were normal for the patient's age [No Focal Deficits] : no focal deficits

## 2022-06-02 NOTE — HISTORY OF PRESENT ILLNESS
[FreeTextEntry1] : 76 YO M seen 11/19/2021 as NPT for consultation after a failed implant. PAtient had a radical prostatectomy at [facility] [place] [facility] 13 yr ago. Due to post op ED, he underwent implantation of 3 piece inflatable IPP 11 yr ago. This functioned until 1 yr ago. Patient underwent removal and replacement surgery by Jeovanny Stark on 8/13/2021. At that time, Dr. 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 He was unable to remove the right reservoir. PAtient has healed well, but is very dissatisfied with the malleable prosthesis that was implanted. It is unstable and anita. He is here to discuss options.\par \par Patient underwent implantation of AMS Ambicor IPP at [facility] on 1/5/2022 with no complications.\par \par Patient seen 1/7/2022 for VT. He is comfortable since surgery, He started the Levaquin late but is now on the Keflex as prescribed. \par \par Patient seen 1/14/2022 for post op wound check. He notes persistent post op pain in the shaft but does not require pain medication.\par \par Patient seen  1/21/2022 for interval check. He continues to improve with less pain.\par \par Patient seen  2/4/2022 for interval check. He continues to heal well with no signs of infection. He has noted small raised erythematous spots scattered on his abdomen and shoulders over the last 2 - 3 days, somewhat itchy at times. \par UA negative.\par \par Patient seen  2/18/2022 for more instruction with inflation and deflation. Swelling continues to improve, no sign of infection or pain. \par \par Patient seen 3/4/2022 for interval check. He told me that he has been able to successfully inflate and deflate the prosthesis and has had satisfactory intercourse since our last visit. He has noted some mild tenderness with siting in the area of the left testis.\par UA  negative\par I reviewed inflation and deflation with the patient. He had been pumping right over the area of mild swelling anterior to the pump and I recommended that he try to avoid that spot. He was instructed to continue to use the prosthesis as desired. FU 3 months. \par \par Patient seen TODAY 6/2/2022 to reassess. The prosthesis works well but he still has some difficulty identifying the pump. He has not been back to [facility] concerning his prostate cancer in years, is not certain f his PCP Mehdi checks the PSA and declines PSA testing today. Patient also c/o mild NIKO.\par UA negative\par IPSS 9\par SHREE 10\par HORACIO 8

## 2023-06-01 ENCOUNTER — APPOINTMENT (OUTPATIENT)
Dept: UROLOGY | Facility: CLINIC | Age: 78
End: 2023-06-01
Payer: MEDICARE

## 2023-06-01 VITALS
HEART RATE: 70 BPM | TEMPERATURE: 97.7 F | DIASTOLIC BLOOD PRESSURE: 79 MMHG | OXYGEN SATURATION: 99 % | SYSTOLIC BLOOD PRESSURE: 139 MMHG | RESPIRATION RATE: 14 BRPM

## 2023-06-01 DIAGNOSIS — N39.3 STRESS INCONTINENCE (FEMALE) (MALE): ICD-10-CM

## 2023-06-01 DIAGNOSIS — N52.01 ERECTILE DYSFUNCTION DUE TO ARTERIAL INSUFFICIENCY: ICD-10-CM

## 2023-06-01 DIAGNOSIS — C61 MALIGNANT NEOPLASM OF PROSTATE: ICD-10-CM

## 2023-06-01 PROCEDURE — 99213 OFFICE O/P EST LOW 20 MIN: CPT

## 2023-06-01 NOTE — HISTORY OF PRESENT ILLNESS
[FreeTextEntry1] : 77 YO M seen 11/19/2021 as NPT for consultation after a failed implant. PAtient had a radical prostatectomy at [facility] 13 yr ago. Due to post op ED, he underwent implantation of 3 piece inflatable IPP 11 yr ago. This functioned until 1 yr ago. Patient underwent removal and replacement surgery by Jeovanny Stark on 8/13/2021. At that time, Dr. 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 He was unable to remove the right reservoir. PAtient has healed well, but is very dissatisfied with the malleable prosthesis that was implanted. It is unstable and anita. He is here to discuss options.\par \par Patient underwent implantation of AMS Ambicor IPP at [facility] on 1/5/2022 with no complications.\par \par Patient seen 1/7/2022 for VT. He is comfortable since surgery, He started the Levaquin late but is now on the Keflex as prescribed. \par \par Patient seen 1/14/2022 for post op wound check. He notes persistent post op pain in the shaft but does not require pain medication.\par \par Patient seen  1/21/2022 for interval check. He continues to improve with less pain.\par \par Patient seen  2/4/2022 for interval check. He continues to heal well with no signs of infection. He has noted small raised erythematous spots scattered on his abdomen and shoulders over the last 2 - 3 days, somewhat itchy at times. \par UA negative.\par \par Patient seen  2/18/2022 for more instruction with inflation and deflation. Swelling continues to improve, no sign of infection or pain. \par \par Patient seen 3/4/2022 for interval check. He told me that he has been able to successfully inflate and deflate the prosthesis and has had satisfactory intercourse since our last visit. He has noted some mild tenderness with siting in the area of the left testis.\par UA  negative\par I reviewed inflation and deflation with the patient. He had been pumping right over the area of mild swelling anterior to the pump and I recommended that he try to avoid that spot. He was instructed to continue to use the prosthesis as desired. FU 3 months. \par \par Patient seen  6/2/2022 to reassess. The prosthesis works well but he still has some difficulty identifying the pump. He has not been back to [facility] concerning his prostate cancer in years, is not certain f his PCP Mehdi checks the PSA and declines PSA testing today. Patient also c/o mild NIKO.\par UA negative\par IPSS 9\par SHREE 10\par HORACIO 8\par I reviewed with the patient proper use of the pump floor inflation and deflation and allowed him to identify the pump after my direction. I also supplied him with further written information and diagrams to help him when he is not in the office. He appeared satisfied with this information and feels that he will be able to function normally.\par We discussed follow-up of his prostate cancer. He informs me that if his PSA is not evaluated by PCP Mehdi on his next visit he will notify me and we will test when I see him next. We made plans to follow-up in 1 year.\par As for the mild S URI, I reviewed with him options including periurethral injections which have questionable indication and someone within planted inflatable penile prosthesis. I also reviewed with him implantation of an artificial sphincter and the difficulties attached to another implant in the same general area. For now he is content to remain with his symptoms. \par \par Patient seen TODAY 6/1/2023 to reassess. He has had no issues with the IPP, which works when needed (less frequently at the present time). He also is followed by GOKUL Harding for the PSA post treatment. He still has mild NIKO which is tolerable.

## 2023-06-01 NOTE — ASSESSMENT
[FreeTextEntry1] : Patient is stable at this point with a functioning prosthesis.  I did send blood today for PSA post.  We will call him with result.  If that remains stable, then he should follow-up with his PCP: On a routine basis and see me only as needed moving forward. Khadra Roper MD\par

## 2023-06-01 NOTE — PHYSICAL EXAM
[General Appearance - Well Developed] : well developed [General Appearance - Well Nourished] : well nourished [Normal Appearance] : normal appearance [Well Groomed] : well groomed [General Appearance - In No Acute Distress] : no acute distress [FreeTextEntry1] : IPP inflates and deflates well with full, rigid, lasting erection when inflated.

## 2023-06-02 LAB — PSA, POST - PROSTATECTOMY: <0.01 NG/ML

## (undated) DEVICE — DRSG DERMABOND 0.7ML

## (undated) DEVICE — SUT VICRYL 3-0 27" RB-1 UNDYED

## (undated) DEVICE — DRAIN URINARY LEG BAG WITH FLIP-FLO VALVE 32OZ

## (undated) DEVICE — DRAPE 3/4 SHEET 52X76"

## (undated) DEVICE — SUT PDS II 3-0 27" RB-1

## (undated) DEVICE — SUT PROLENE 4-0 30" KS

## (undated) DEVICE — DRSG TAPE MICROFOAM 3"

## (undated) DEVICE — SUPP ATHLETIC MALE XLG 44-55IN

## (undated) DEVICE — WARMING BLANKET UPPER ADULT

## (undated) DEVICE — DRAPE SURGICAL #1010

## (undated) DEVICE — PACK PROST PENILE LNX SURGICOUNT

## (undated) DEVICE — TAPE UMBILICAL 1/8 X 30" STRANDS

## (undated) DEVICE — GLV 9 PROTEXIS (WHITE)

## (undated) DEVICE — VENODYNE/SCD SLEEVE CALF MEDIUM

## (undated) DEVICE — LONE STAR DILAMEZINSERT INSERTER BLUE 12MM